# Patient Record
Sex: FEMALE | Race: BLACK OR AFRICAN AMERICAN | NOT HISPANIC OR LATINO | Employment: FULL TIME | URBAN - METROPOLITAN AREA
[De-identification: names, ages, dates, MRNs, and addresses within clinical notes are randomized per-mention and may not be internally consistent; named-entity substitution may affect disease eponyms.]

---

## 2017-09-18 ENCOUNTER — APPOINTMENT (OUTPATIENT)
Dept: OTHER | Facility: IMAGING CENTER | Age: 32
End: 2017-09-18

## 2017-10-05 ENCOUNTER — EH NON-PROVIDER (OUTPATIENT)
Dept: OCCUPATIONAL MEDICINE | Facility: CLINIC | Age: 32
End: 2017-10-05

## 2017-10-05 ENCOUNTER — HOSPITAL ENCOUNTER (OUTPATIENT)
Facility: MEDICAL CENTER | Age: 32
End: 2017-10-05
Attending: PREVENTIVE MEDICINE
Payer: COMMERCIAL

## 2017-10-05 ENCOUNTER — EMPLOYEE HEALTH (OUTPATIENT)
Dept: OCCUPATIONAL MEDICINE | Facility: CLINIC | Age: 32
End: 2017-10-05

## 2017-10-05 VITALS
DIASTOLIC BLOOD PRESSURE: 72 MMHG | SYSTOLIC BLOOD PRESSURE: 110 MMHG | BODY MASS INDEX: 33.75 KG/M2 | TEMPERATURE: 96.6 F | HEART RATE: 120 BPM | HEIGHT: 66 IN | WEIGHT: 210 LBS | OXYGEN SATURATION: 96 %

## 2017-10-05 DIAGNOSIS — Z02.1 PRE-EMPLOYMENT HEALTH SCREENING EXAMINATION: ICD-10-CM

## 2017-10-05 DIAGNOSIS — Z02.1 PRE-EMPLOYMENT DRUG SCREENING: ICD-10-CM

## 2017-10-05 LAB
AMP AMPHETAMINE: NORMAL
BAR BARBITURATES: NORMAL
BZO BENZODIAZEPINES: NORMAL
COC COCAINE: NORMAL
HBV CORE AB SERPL QL IA: NEGATIVE
INT CON NEG: NORMAL
INT CON POS: NORMAL
MDMA ECSTASY: NORMAL
MET METHAMPHETAMINES: NORMAL
MTD METHADONE: NORMAL
OPI OPIATES: NORMAL
OXY OXYCODONE: NORMAL
PCP PHENCYCLIDINE: NORMAL
POC URINE DRUG SCREEN OCDRS: NORMAL
THC: NORMAL

## 2017-10-05 PROCEDURE — 86787 VARICELLA-ZOSTER ANTIBODY: CPT | Performed by: PREVENTIVE MEDICINE

## 2017-10-05 PROCEDURE — 86480 TB TEST CELL IMMUN MEASURE: CPT | Performed by: PREVENTIVE MEDICINE

## 2017-10-05 PROCEDURE — 86706 HEP B SURFACE ANTIBODY: CPT | Performed by: PREVENTIVE MEDICINE

## 2017-10-05 PROCEDURE — 80305 DRUG TEST PRSMV DIR OPT OBS: CPT | Performed by: PREVENTIVE MEDICINE

## 2017-10-05 PROCEDURE — 86765 RUBEOLA ANTIBODY: CPT | Performed by: PREVENTIVE MEDICINE

## 2017-10-05 PROCEDURE — 94375 RESPIRATORY FLOW VOLUME LOOP: CPT | Performed by: PREVENTIVE MEDICINE

## 2017-10-05 PROCEDURE — 8915 PR COMPREHENSIVE PHYSICAL: Performed by: PREVENTIVE MEDICINE

## 2017-10-05 PROCEDURE — 90715 TDAP VACCINE 7 YRS/> IM: CPT | Performed by: PREVENTIVE MEDICINE

## 2017-10-05 PROCEDURE — 86704 HEP B CORE ANTIBODY TOTAL: CPT | Performed by: PREVENTIVE MEDICINE

## 2017-10-05 PROCEDURE — 87340 HEPATITIS B SURFACE AG IA: CPT | Performed by: PREVENTIVE MEDICINE

## 2017-10-05 PROCEDURE — 86762 RUBELLA ANTIBODY: CPT | Performed by: PREVENTIVE MEDICINE

## 2017-10-05 PROCEDURE — 86735 MUMPS ANTIBODY: CPT | Performed by: PREVENTIVE MEDICINE

## 2017-10-05 NOTE — NON-PROVIDER
[employer] New [employer]  1. Drug Screen   2. Immunizations  - Quantiferon  labs  - Varicella  Labs  - MMR  labs  - Hep B  Labs   - Tdap  Given 10/05/17 pt is 31 weeks pregnant ok per Dr Meléndez to give vaccine.    - Flu   Declined   3. Mask Fit        N-95 Reg and CAPR   4. Physical Clearance

## 2017-10-06 LAB
HBV SURFACE AB SERPL IA-ACNC: <3.1 MIU/ML (ref 0–10)
HBV SURFACE AG SER QL: NEGATIVE
M TB TUBERC IFN-G BLD QL: NEGATIVE
M TB TUBERC IFN-G/MITOGEN IGNF BLD: -0
M TB TUBERC IGNF/MITOGEN IGNF CONTROL: 47.12 [IU]/ML
MEV IGG SER IA-ACNC: POSITIVE
MITOGEN IGNF BCKGRD COR BLD-ACNC: 0.02 [IU]/ML
MUV IGG SER IA-ACNC: POSITIVE
RUBV AB SER QL: 88.8 IU/ML
VZV IGG SER IA-ACNC: POSITIVE

## 2017-11-03 ENCOUNTER — HOSPITAL ENCOUNTER (OUTPATIENT)
Facility: MEDICAL CENTER | Age: 32
End: 2017-11-03
Attending: SPECIALIST | Admitting: SPECIALIST
Payer: MEDICAID

## 2017-11-03 VITALS — DIASTOLIC BLOOD PRESSURE: 74 MMHG | TEMPERATURE: 98.3 F | HEART RATE: 104 BPM | SYSTOLIC BLOOD PRESSURE: 127 MMHG

## 2017-11-03 LAB
APPEARANCE UR: ABNORMAL
COLOR UR AUTO: ABNORMAL
CRYSTALS AMN MICRO: NORMAL
GLUCOSE UR QL STRIP.AUTO: NEGATIVE MG/DL
KETONES UR QL STRIP.AUTO: NEGATIVE MG/DL
LEUKOCYTE ESTERASE UR QL STRIP.AUTO: NEGATIVE
NITRITE UR QL STRIP.AUTO: NEGATIVE
PH UR STRIP.AUTO: 6.5 [PH]
PROT UR QL STRIP: 30 MG/DL
RBC UR QL AUTO: ABNORMAL
SP GR UR: 1.02

## 2017-11-03 PROCEDURE — 89060 EXAM SYNOVIAL FLUID CRYSTALS: CPT

## 2017-11-03 PROCEDURE — 59025 FETAL NON-STRESS TEST: CPT | Performed by: SPECIALIST

## 2017-11-03 PROCEDURE — 81002 URINALYSIS NONAUTO W/O SCOPE: CPT

## 2017-11-04 NOTE — PROGRESS NOTES
2145- 33 y/o, , EDC 17, EGA 34.3. Here to room LDA05. C/o lof when she vomited in the shower x1. Patient denies any leaking since and states she doesn't know if she peed herself or her water broke. EFM/toco applied, patient denies bleeding, reports positive fm. VSS.     - Sterile speculum exam performed. No pooling or wetness noted. Fern sent to lab. Large blood in urine. Patient denies any pain on urination but states she has sharp lower back pain that comes and goes. Urine is tre color.     - Fern test negative. Updates to Dr. Pugh. MD to bedside for assessment. MD reviewed FHT. Discharge instructions received. Urine culture ordered.     - Discussed discharge instruction with patient including  labor precautions and reasons to return. Encouraged patient to return for increased back pain or pain on urination. Patient to follow up with Dr. Pugh in the office on Monday. Patient agrees and has no further questions. Patient ambulated off the unit.

## 2017-11-18 ENCOUNTER — TELEPHONE (OUTPATIENT)
Dept: OCCUPATIONAL MEDICINE | Facility: CLINIC | Age: 32
End: 2017-11-18

## 2017-11-18 NOTE — TELEPHONE ENCOUNTER
Phone Number Called: [phone] (home)       Message: spoke to pt and will come in for hep b decliantion    Left Message for patient to call back: no

## 2017-12-06 ENCOUNTER — HOSPITAL ENCOUNTER (INPATIENT)
Facility: MEDICAL CENTER | Age: 32
LOS: 2 days | End: 2017-12-08
Attending: SPECIALIST | Admitting: SPECIALIST
Payer: MEDICAID

## 2017-12-06 PROBLEM — Z34.90 PREGNANCY: Status: ACTIVE | Noted: 2017-12-06

## 2017-12-06 LAB
BASOPHILS # BLD AUTO: 0.3 % (ref 0–1.8)
BASOPHILS # BLD: 0.04 K/UL (ref 0–0.12)
EOSINOPHIL # BLD AUTO: 0.13 K/UL (ref 0–0.51)
EOSINOPHIL NFR BLD: 0.9 % (ref 0–6.9)
ERYTHROCYTE [DISTWIDTH] IN BLOOD BY AUTOMATED COUNT: 47.6 FL (ref 35.9–50)
ERYTHROCYTE [DISTWIDTH] IN BLOOD BY AUTOMATED COUNT: 48 FL (ref 35.9–50)
HCT VFR BLD AUTO: 31.9 % (ref 37–47)
HCT VFR BLD AUTO: 33.8 % (ref 37–47)
HGB BLD-MCNC: 10.6 G/DL (ref 12–16)
HGB BLD-MCNC: 11.4 G/DL (ref 12–16)
HOLDING TUBE BB 8507: NORMAL
IMM GRANULOCYTES # BLD AUTO: 0.08 K/UL (ref 0–0.11)
IMM GRANULOCYTES NFR BLD AUTO: 0.5 % (ref 0–0.9)
LYMPHOCYTES # BLD AUTO: 2.88 K/UL (ref 1–4.8)
LYMPHOCYTES NFR BLD: 19.4 % (ref 22–41)
MCH RBC QN AUTO: 32.9 PG (ref 27–33)
MCH RBC QN AUTO: 33 PG (ref 27–33)
MCHC RBC AUTO-ENTMCNC: 33.2 G/DL (ref 33.6–35)
MCHC RBC AUTO-ENTMCNC: 33.7 G/DL (ref 33.6–35)
MCV RBC AUTO: 98 FL (ref 81.4–97.8)
MCV RBC AUTO: 99.1 FL (ref 81.4–97.8)
MONOCYTES # BLD AUTO: 0.73 K/UL (ref 0–0.85)
MONOCYTES NFR BLD AUTO: 4.9 % (ref 0–13.4)
NEUTROPHILS # BLD AUTO: 10.95 K/UL (ref 2–7.15)
NEUTROPHILS NFR BLD: 74 % (ref 44–72)
NRBC # BLD AUTO: 0 K/UL
NRBC BLD AUTO-RTO: 0 /100 WBC
PLATELET # BLD AUTO: 276 K/UL (ref 164–446)
PLATELET # BLD AUTO: 305 K/UL (ref 164–446)
PMV BLD AUTO: 9.4 FL (ref 9–12.9)
PMV BLD AUTO: 9.6 FL (ref 9–12.9)
RBC # BLD AUTO: 3.22 M/UL (ref 4.2–5.4)
RBC # BLD AUTO: 3.45 M/UL (ref 4.2–5.4)
WBC # BLD AUTO: 14.8 K/UL (ref 4.8–10.8)
WBC # BLD AUTO: 19.4 K/UL (ref 4.8–10.8)

## 2017-12-06 PROCEDURE — 4A1HX4Z MONITORING OF PRODUCTS OF CONCEPTION, CARDIAC ELECTRICAL ACTIVITY, EXTERNAL APPROACH: ICD-10-PCS | Performed by: SPECIALIST

## 2017-12-06 PROCEDURE — 700102 HCHG RX REV CODE 250 W/ 637 OVERRIDE(OP): Performed by: SPECIALIST

## 2017-12-06 PROCEDURE — 770002 HCHG ROOM/CARE - OB PRIVATE (112)

## 2017-12-06 PROCEDURE — 36415 COLL VENOUS BLD VENIPUNCTURE: CPT

## 2017-12-06 PROCEDURE — 85027 COMPLETE CBC AUTOMATED: CPT

## 2017-12-06 PROCEDURE — 59409 OBSTETRICAL CARE: CPT

## 2017-12-06 PROCEDURE — 700105 HCHG RX REV CODE 258

## 2017-12-06 PROCEDURE — 85025 COMPLETE CBC W/AUTO DIFF WBC: CPT

## 2017-12-06 PROCEDURE — 700111 HCHG RX REV CODE 636 W/ 250 OVERRIDE (IP): Performed by: SPECIALIST

## 2017-12-06 PROCEDURE — 700111 HCHG RX REV CODE 636 W/ 250 OVERRIDE (IP)

## 2017-12-06 PROCEDURE — A9270 NON-COVERED ITEM OR SERVICE: HCPCS | Performed by: SPECIALIST

## 2017-12-06 PROCEDURE — 10907ZC DRAINAGE OF AMNIOTIC FLUID, THERAPEUTIC FROM PRODUCTS OF CONCEPTION, VIA NATURAL OR ARTIFICIAL OPENING: ICD-10-PCS | Performed by: SPECIALIST

## 2017-12-06 PROCEDURE — 304965 HCHG RECOVERY SERVICES

## 2017-12-06 PROCEDURE — 700112 HCHG RX REV CODE 229: Performed by: SPECIALIST

## 2017-12-06 PROCEDURE — 3E033VJ INTRODUCTION OF OTHER HORMONE INTO PERIPHERAL VEIN, PERCUTANEOUS APPROACH: ICD-10-PCS | Performed by: SPECIALIST

## 2017-12-06 RX ORDER — OXYCODONE AND ACETAMINOPHEN 10; 325 MG/1; MG/1
1 TABLET ORAL EVERY 4 HOURS PRN
Status: DISCONTINUED | OUTPATIENT
Start: 2017-12-06 | End: 2017-12-08 | Stop reason: HOSPADM

## 2017-12-06 RX ORDER — CALCIUM CARBONATE 500 MG/1
500 TABLET, CHEWABLE ORAL DAILY
COMMUNITY

## 2017-12-06 RX ORDER — PROMETHAZINE HYDROCHLORIDE 25 MG/1
25 TABLET ORAL PRN
COMMUNITY

## 2017-12-06 RX ORDER — SODIUM CHLORIDE, SODIUM LACTATE, POTASSIUM CHLORIDE, CALCIUM CHLORIDE 600; 310; 30; 20 MG/100ML; MG/100ML; MG/100ML; MG/100ML
INJECTION, SOLUTION INTRAVENOUS PRN
Status: DISCONTINUED | OUTPATIENT
Start: 2017-12-06 | End: 2017-12-08 | Stop reason: HOSPADM

## 2017-12-06 RX ORDER — LIDOCAINE HYDROCHLORIDE 10 MG/ML
INJECTION, SOLUTION INFILTRATION; PERINEURAL
Status: ACTIVE
Start: 2017-12-06 | End: 2017-12-07

## 2017-12-06 RX ORDER — AMPICILLIN 2 G/1
2 INJECTION, POWDER, FOR SOLUTION INTRAVENOUS ONCE
Status: DISCONTINUED | OUTPATIENT
Start: 2017-12-06 | End: 2017-12-06 | Stop reason: SDUPTHER

## 2017-12-06 RX ORDER — ONDANSETRON 2 MG/ML
4 INJECTION INTRAMUSCULAR; INTRAVENOUS EVERY 6 HOURS PRN
Status: DISCONTINUED | OUTPATIENT
Start: 2017-12-06 | End: 2017-12-08 | Stop reason: HOSPADM

## 2017-12-06 RX ORDER — AMPICILLIN 2 G/1
2 INJECTION, POWDER, FOR SOLUTION INTRAVENOUS ONCE
Status: COMPLETED | OUTPATIENT
Start: 2017-12-06 | End: 2017-12-06

## 2017-12-06 RX ORDER — IBUPROFEN 600 MG/1
600 TABLET ORAL EVERY 6 HOURS PRN
Status: DISCONTINUED | OUTPATIENT
Start: 2017-12-06 | End: 2017-12-08 | Stop reason: HOSPADM

## 2017-12-06 RX ORDER — OXYCODONE HYDROCHLORIDE AND ACETAMINOPHEN 5; 325 MG/1; MG/1
1 TABLET ORAL EVERY 4 HOURS PRN
Status: DISCONTINUED | OUTPATIENT
Start: 2017-12-06 | End: 2017-12-08 | Stop reason: HOSPADM

## 2017-12-06 RX ORDER — METHYLERGONOVINE MALEATE 0.2 MG/ML
0.2 INJECTION INTRAVENOUS
Status: DISCONTINUED | OUTPATIENT
Start: 2017-12-06 | End: 2017-12-06 | Stop reason: HOSPADM

## 2017-12-06 RX ORDER — SODIUM CHLORIDE, SODIUM LACTATE, POTASSIUM CHLORIDE, CALCIUM CHLORIDE 600; 310; 30; 20 MG/100ML; MG/100ML; MG/100ML; MG/100ML
INJECTION, SOLUTION INTRAVENOUS
Status: COMPLETED
Start: 2017-12-06 | End: 2017-12-06

## 2017-12-06 RX ORDER — SODIUM CHLORIDE, SODIUM LACTATE, POTASSIUM CHLORIDE, CALCIUM CHLORIDE 600; 310; 30; 20 MG/100ML; MG/100ML; MG/100ML; MG/100ML
INJECTION, SOLUTION INTRAVENOUS CONTINUOUS
Status: DISCONTINUED | OUTPATIENT
Start: 2017-12-06 | End: 2017-12-06 | Stop reason: HOSPADM

## 2017-12-06 RX ORDER — HYDROXYZINE 50 MG/1
50 TABLET, FILM COATED ORAL EVERY 6 HOURS PRN
Status: DISCONTINUED | OUTPATIENT
Start: 2017-12-06 | End: 2017-12-06 | Stop reason: HOSPADM

## 2017-12-06 RX ORDER — SODIUM CHLORIDE, SODIUM LACTATE, POTASSIUM CHLORIDE, CALCIUM CHLORIDE 600; 310; 30; 20 MG/100ML; MG/100ML; MG/100ML; MG/100ML
INJECTION, SOLUTION INTRAVENOUS CONTINUOUS
Status: DISCONTINUED | OUTPATIENT
Start: 2017-12-06 | End: 2017-12-06

## 2017-12-06 RX ORDER — AMPICILLIN 2 G/1
INJECTION, POWDER, FOR SOLUTION INTRAVENOUS
Status: COMPLETED
Start: 2017-12-06 | End: 2017-12-06

## 2017-12-06 RX ORDER — ALUMINA, MAGNESIA, AND SIMETHICONE 2400; 2400; 240 MG/30ML; MG/30ML; MG/30ML
30 SUSPENSION ORAL EVERY 6 HOURS PRN
Status: DISCONTINUED | OUTPATIENT
Start: 2017-12-06 | End: 2017-12-06 | Stop reason: HOSPADM

## 2017-12-06 RX ORDER — ACETAMINOPHEN 325 MG/1
325 TABLET ORAL EVERY 4 HOURS PRN
Status: DISCONTINUED | OUTPATIENT
Start: 2017-12-06 | End: 2017-12-08 | Stop reason: HOSPADM

## 2017-12-06 RX ORDER — METHYLERGONOVINE MALEATE 0.2 MG/ML
0.2 INJECTION INTRAVENOUS
Status: DISCONTINUED | OUTPATIENT
Start: 2017-12-06 | End: 2017-12-08 | Stop reason: HOSPADM

## 2017-12-06 RX ORDER — ONDANSETRON 4 MG/1
4 TABLET, ORALLY DISINTEGRATING ORAL EVERY 6 HOURS PRN
Status: DISCONTINUED | OUTPATIENT
Start: 2017-12-06 | End: 2017-12-08 | Stop reason: HOSPADM

## 2017-12-06 RX ORDER — ROPIVACAINE HYDROCHLORIDE 2 MG/ML
INJECTION, SOLUTION EPIDURAL; INFILTRATION; PERINEURAL
Status: ACTIVE
Start: 2017-12-06 | End: 2017-12-07

## 2017-12-06 RX ORDER — AMPICILLIN 1 G/1
1 INJECTION, POWDER, FOR SOLUTION INTRAMUSCULAR; INTRAVENOUS EVERY 4 HOURS
Status: DISCONTINUED | OUTPATIENT
Start: 2017-12-06 | End: 2017-12-06 | Stop reason: SDUPTHER

## 2017-12-06 RX ORDER — AMPICILLIN 1 G/1
1 INJECTION, POWDER, FOR SOLUTION INTRAMUSCULAR; INTRAVENOUS EVERY 4 HOURS
Status: DISCONTINUED | OUTPATIENT
Start: 2017-12-06 | End: 2017-12-06 | Stop reason: HOSPADM

## 2017-12-06 RX ORDER — DOCUSATE SODIUM 100 MG/1
100 CAPSULE, LIQUID FILLED ORAL 2 TIMES DAILY PRN
Status: DISCONTINUED | OUTPATIENT
Start: 2017-12-06 | End: 2017-12-08 | Stop reason: HOSPADM

## 2017-12-06 RX ORDER — VITAMIN A ACETATE, BETA CAROTENE, ASCORBIC ACID, CHOLECALCIFEROL, .ALPHA.-TOCOPHEROL ACETATE, DL-, THIAMINE MONONITRATE, RIBOFLAVIN, NIACINAMIDE, PYRIDOXINE HYDROCHLORIDE, FOLIC ACID, CYANOCOBALAMIN, CALCIUM CARBONATE, FERROUS FUMARATE, ZINC OXIDE, CUPRIC OXIDE 3080; 12; 120; 400; 1; 1.84; 3; 20; 22; 920; 25; 200; 27; 10; 2 [IU]/1; UG/1; MG/1; [IU]/1; MG/1; MG/1; MG/1; MG/1; MG/1; [IU]/1; MG/1; MG/1; MG/1; MG/1; MG/1
1 TABLET, FILM COATED ORAL EVERY MORNING
Status: DISCONTINUED | OUTPATIENT
Start: 2017-12-07 | End: 2017-12-08 | Stop reason: HOSPADM

## 2017-12-06 RX ADMIN — AMPICILLIN SODIUM 1 G: 1 INJECTION, POWDER, FOR SOLUTION INTRAMUSCULAR; INTRAVENOUS at 10:22

## 2017-12-06 RX ADMIN — SODIUM CHLORIDE, POTASSIUM CHLORIDE, SODIUM LACTATE AND CALCIUM CHLORIDE 1000 ML: 600; 310; 30; 20 INJECTION, SOLUTION INTRAVENOUS at 06:24

## 2017-12-06 RX ADMIN — SODIUM CHLORIDE, SODIUM LACTATE, POTASSIUM CHLORIDE, CALCIUM CHLORIDE 1000 ML: 600; 310; 30; 20 INJECTION, SOLUTION INTRAVENOUS at 06:24

## 2017-12-06 RX ADMIN — AMPICILLIN SODIUM 2 G: 2 INJECTION, POWDER, FOR SOLUTION INTRAMUSCULAR; INTRAVENOUS at 06:22

## 2017-12-06 RX ADMIN — ALUMINUM HYDROXIDE, MAGNESIUM HYDROXIDE,SIMETHICONE 30 ML: 400; 400; 40 LIQUID ORAL at 08:40

## 2017-12-06 RX ADMIN — Medication 125 ML/HR: at 14:12

## 2017-12-06 RX ADMIN — DOCUSATE SODIUM 100 MG: 100 CAPSULE ORAL at 20:30

## 2017-12-06 RX ADMIN — IBUPROFEN 600 MG: 600 TABLET, FILM COATED ORAL at 15:58

## 2017-12-06 RX ADMIN — AMPICILLIN 2 G: 2 INJECTION, POWDER, FOR SOLUTION INTRAVENOUS at 06:22

## 2017-12-06 RX ADMIN — OXYCODONE HYDROCHLORIDE AND ACETAMINOPHEN 1 TABLET: 10; 325 TABLET ORAL at 15:58

## 2017-12-06 RX ADMIN — Medication 1 MILLI-UNITS/MIN: at 07:53

## 2017-12-06 RX ADMIN — OXYCODONE HYDROCHLORIDE AND ACETAMINOPHEN 1 TABLET: 10; 325 TABLET ORAL at 20:30

## 2017-12-06 ASSESSMENT — PAIN SCALES - GENERAL
PAINLEVEL_OUTOF10: 7
PAINLEVEL_OUTOF10: 2
PAINLEVEL_OUTOF10: 7
PAINLEVEL_OUTOF10: 6
PAINLEVEL_OUTOF10: 2

## 2017-12-06 ASSESSMENT — PATIENT HEALTH QUESTIONNAIRE - PHQ9
2. FEELING DOWN, DEPRESSED, IRRITABLE, OR HOPELESS: NOT AT ALL
SUM OF ALL RESPONSES TO PHQ QUESTIONS 1-9: 0
1. LITTLE INTEREST OR PLEASURE IN DOING THINGS: NOT AT ALL
SUM OF ALL RESPONSES TO PHQ9 QUESTIONS 1 AND 2: 0

## 2017-12-06 ASSESSMENT — LIFESTYLE VARIABLES
EVER_SMOKED: YES
DO YOU DRINK ALCOHOL: NO
DO YOU DRINK ALCOHOL: NO

## 2017-12-06 NOTE — DELIVERY NOTE
Normal spontaneous vaginal delivery.   Live term female , Apgar scores of about 8 and 9 at one and five minutes respectively.   Weight not yet recorded.   Baby delivered over intact perineum under no anesthesia.   Placenta simply delivered and examined and found to be complete.   No vulvar or vaginal lacerations.   Uterus firm.   EBL about 100 cc's.   Evelio Pugh M.D.

## 2017-12-06 NOTE — PROGRESS NOTES
DATE OF SERVICE:  2017    The patient was seen on 2017.    The patient is a very pleasant 32-year-old multipara ( 7, para 3), at   34 weeks and 3 days gestation.  She presented to labor and delivery today with   complaints of leakage of fluid per vagina and says she has had no further   leakage of fluid per vagina since the initial episode when she noticed leakage   of fluid per vagina.  She was concerned that membranes might have ruptured   and so she presented to labor and delivery.  In labor and delivery, her vital   signs were stable and she was afebrile, and sterile speculum exam performed by   the nurse revealed no pooling and a fern test was sent and came back   negative.  I spoke with the patient and explained to her that it appears that   she has not had rupture of membranes.  I asked her to follow up with me in the   office for her previously scheduled appointment and to call or contact me at   any time should she have any problems or questions or complaints and she said   that she would do so.       ____________________________________     MD KAYODE CRUZ / NTS    DD:  2017 05:31:00  DT:  2017 10:23:27    D#:  [id number]  Job#:  [id number]

## 2017-12-06 NOTE — PROGRESS NOTES
The patient is a very pleasant 32 year old multipara (para 3, with 3 previous vaginal deliveries) at 39 weeks and 1 day gestation with prenatal care with myself and she is admitted this morning for elective induction of labor. By the nurse's exam the cervix is 4 cm dilated and 80 percent effaced and ballotable. The fetal heart tracing is category one. Her recent vaginal culture for GBS was positive and so she was started on ampicillin two grams IV. We will start pitocin and gradually increase pitocin. Will give analgesia prn and anticipate an obstetrical delivery.   Evelio Pugh M.D.

## 2017-12-06 NOTE — PROGRESS NOTES
I just checked the cervix and found the cervix to be about 8 cm dilated and 80 to 90 percent effaced and about 0 station. She already received her second dose of ampicillin. I then performed artificial rupture of membranes and clear amniotic fluid was observed. The fetal heart tracing is category one. Will anticipate an obstetrical delivery.   Evelio Pugh M.D.

## 2017-12-06 NOTE — PROGRESS NOTES
DATE OF SERVICE:  11/03/2017    The patient is a very pleasant 32-year-old multipara (para 3 with 3 previous   vaginal deliveries), who was 34 weeks and 3 days gestation.  She presented   with complaints of leakage of fluid shortly after she had experienced   vomiting.  She had not had any further leakage of fluid since this episode of   leakage of fluid per vagina.  The nurse performed a sterile speculum and no   pooling was seen.  The fern test came back negative.  I spoke with the patient   and gave her reassurance and she was discharged home.       ____________________________________     DONALDO NAVARRETE MD    MED / NTS    DD:  12/06/2017 07:14:21  DT:  12/06/2017 07:31:24    D#:  [id number]  Job#:  [id number]

## 2017-12-06 NOTE — CARE PLAN
Problem: Pain  Goal: Alleviation of Pain or a reduction in pain to the patient's comfort goal    Intervention: Pain Management - Epidural/Spinal  Pain management discussed and pt would like to go natural. Pt is aware of epidural availability.       Problem: Risk for Infection, Impaired Wound Healing  Goal: Remain free from signs and symptoms of infection    Intervention: Infection prevention measures  Practice proper hand hygiene before and after pt contact.

## 2017-12-06 NOTE — PROGRESS NOTES
0700-bedside report received from MARIANGEL Canales.  Pt VS stable, reports +FM, denies LOF or Vb. No reports pain at this time.   0723-Talked with Dr Pugh on the phone. FHT baseline 160's, pt temp 97.2F, Dr Pugh aware and ok to start pitocin. Plans to re-check pt around lunch and possibly AROM. Labor plan discussed with pt and plan for no epidural.   1130- Pt reports SROM, puddle noted on pad, clear. UC pain increasing. Discussed pain management  1135- Dr Pugh notified and aware. Will check pt when he arrives.   1235-Dr Pugh at bedside. AROM clear fluid SVE 8/80/-1. Pt wants epidural. Understands time constraints.   1300- SVE 9/90/+1. Dr Pugh made aware and on his way.  1318- Delivery of viable baby girl, apgars 8/9. True knot noted.   1320-Delivery of intact placenta. No Lacerations. Pitocin opened.   1332- Fundus firm at U with one large clot expressed and moderate bleeding.  pad changed and ice packs applied.   1500-Pt fundus firm at U with light bleeding. Pt up to Br and able to void. Gown changed and underwear with pad applied. Transferred to PPU.  1510- Bedside report given to MARIANGEL Heck.

## 2017-12-07 PROCEDURE — 770002 HCHG ROOM/CARE - OB PRIVATE (112)

## 2017-12-07 PROCEDURE — A9270 NON-COVERED ITEM OR SERVICE: HCPCS | Performed by: SPECIALIST

## 2017-12-07 PROCEDURE — 90471 IMMUNIZATION ADMIN: CPT

## 2017-12-07 PROCEDURE — 700111 HCHG RX REV CODE 636 W/ 250 OVERRIDE (IP): Performed by: SPECIALIST

## 2017-12-07 PROCEDURE — 700112 HCHG RX REV CODE 229: Performed by: SPECIALIST

## 2017-12-07 PROCEDURE — 3E0234Z INTRODUCTION OF SERUM, TOXOID AND VACCINE INTO MUSCLE, PERCUTANEOUS APPROACH: ICD-10-PCS | Performed by: SPECIALIST

## 2017-12-07 PROCEDURE — 90732 PPSV23 VACC 2 YRS+ SUBQ/IM: CPT | Performed by: SPECIALIST

## 2017-12-07 PROCEDURE — 700102 HCHG RX REV CODE 250 W/ 637 OVERRIDE(OP): Performed by: SPECIALIST

## 2017-12-07 RX ORDER — OXYCODONE HYDROCHLORIDE AND ACETAMINOPHEN 5; 325 MG/1; MG/1
1 TABLET ORAL EVERY 6 HOURS PRN
Qty: 28 TAB | Refills: 0 | Status: SHIPPED | OUTPATIENT
Start: 2017-12-07

## 2017-12-07 RX ORDER — IBUPROFEN 600 MG/1
800 TABLET ORAL EVERY 8 HOURS PRN
Qty: 30 TAB | Refills: 0 | Status: SHIPPED | OUTPATIENT
Start: 2017-12-07

## 2017-12-07 RX ADMIN — IBUPROFEN 600 MG: 600 TABLET, FILM COATED ORAL at 09:50

## 2017-12-07 RX ADMIN — IBUPROFEN 600 MG: 600 TABLET, FILM COATED ORAL at 19:16

## 2017-12-07 RX ADMIN — Medication 1 TABLET: at 09:50

## 2017-12-07 RX ADMIN — PNEUMOCOCCAL VACCINE POLYVALENT 25 MCG
25; 25; 25; 25; 25; 25; 25; 25; 25; 25; 25; 25; 25; 25; 25; 25; 25; 25; 25; 25; 25; 25; 25 INJECTION, SOLUTION INTRAMUSCULAR; SUBCUTANEOUS at 00:12

## 2017-12-07 RX ADMIN — DOCUSATE SODIUM 100 MG: 100 CAPSULE ORAL at 19:16

## 2017-12-07 RX ADMIN — OXYCODONE HYDROCHLORIDE AND ACETAMINOPHEN 1 TABLET: 10; 325 TABLET ORAL at 09:50

## 2017-12-07 RX ADMIN — IBUPROFEN 600 MG: 600 TABLET, FILM COATED ORAL at 00:30

## 2017-12-07 RX ADMIN — OXYCODONE AND ACETAMINOPHEN 1 TABLET: 5; 325 TABLET ORAL at 19:15

## 2017-12-07 RX ADMIN — OXYCODONE HYDROCHLORIDE AND ACETAMINOPHEN 1 TABLET: 10; 325 TABLET ORAL at 00:30

## 2017-12-07 ASSESSMENT — PAIN SCALES - GENERAL
PAINLEVEL_OUTOF10: 6
PAINLEVEL_OUTOF10: 5
PAINLEVEL_OUTOF10: 2
PAINLEVEL_OUTOF10: 4
PAINLEVEL_OUTOF10: 8
PAINLEVEL_OUTOF10: 3
PAINLEVEL_OUTOF10: 4
PAINLEVEL_OUTOF10: 7
PAINLEVEL_OUTOF10: 5

## 2017-12-07 ASSESSMENT — PATIENT HEALTH QUESTIONNAIRE - PHQ9
1. LITTLE INTEREST OR PLEASURE IN DOING THINGS: NOT AT ALL
SUM OF ALL RESPONSES TO PHQ QUESTIONS 1-9: 0
SUM OF ALL RESPONSES TO PHQ9 QUESTIONS 1 AND 2: 0
2. FEELING DOWN, DEPRESSED, IRRITABLE, OR HOPELESS: NOT AT ALL

## 2017-12-07 NOTE — PROGRESS NOTES
POST PARTUM DAY # 1  The patient complains of cramping pain.   She says that she feels fine other wise and she says that she has no other problems or complaints.   The patient's vital signs are stable and she is afebrile.   She appears well developed and well nourished and relaxed and alert and comfortable and in no apparent distress.   Labs: the patient's hemoglobin went from 11.4 grams per deciliter antepartum to 10.6 grams per deciliter post partum.   Will discharge home today.   Rx's for percocet and ibuprofen.   Follow up in office in 6 days for post partum visit.   Evelio Pugh M.D.

## 2017-12-07 NOTE — PROGRESS NOTES
1530 Receive patient from labor and delivery. Oriented to room and emergency call light and the oralia light education for mother and baby. Instructed to call first time to void and to watch increase bleeding and start increase fluid intake.

## 2017-12-07 NOTE — PROGRESS NOTES
Report received from night RN. assumed patient care at 0715. Pt A&OX4. Not in any form of distress at this time.  VSS, Assessment completed. Fundus at umbilical. No heavy bleeding noted. Pt breast feeding. attmepted feeding but baby too sleepy at this time. Call light within reach, personal belongings available, bed in lowest position, hourly rounding in place. Pt educated in importance of calling for assistance. Medications given as ordered (see MAR), reviewed POC with pt. No additional needs at this time.

## 2017-12-07 NOTE — PROGRESS NOTES
This patient has discharge order. Baby is not discharging today. Pt  doesn't want to go home w/o baby.  notified. Discharge order cancelled for today. Pt updated. Ok with POC. Charge nurse notified.

## 2017-12-07 NOTE — PROGRESS NOTES
Pt received on her room awake with PIV on her left FA patent with Pitocin running. Denies any pain @ this time. Voiding without difficulty. Needs attended. Call light within reach. Hourly rounding practiced. Mom wants Pneumonia vaccine.

## 2017-12-07 NOTE — CARE PLAN
Problem: Infection  Goal: Will remain free from infection  Outcome: PROGRESSING AS EXPECTED  No s/s of infection noted. Pt Afebrile. PO intake well.    Problem: Pain Management  Goal: Pain level will decrease to patient's comfort goal  Outcome: PROGRESSING AS EXPECTED  PRN pain meds given with pain at tolerated rate.

## 2017-12-07 NOTE — CARE PLAN
Problem: Potential for postpartum infection related to presence of episiotomy/vaginal tear and/or uterine contamination  Goal: Patient will be absent from signs and symptoms of infection  Perineum slightly swollen but intact , instructed on pericare.     Problem: Alteration in comfort related to episiotomy, vaginal repair and/or after birth pains  Goal: Patient is able to ambulate, care for self and infant  Uses tucks, spray and ice. Pain well controlled with Motrin and Percocet given.

## 2017-12-07 NOTE — CONSULTS
Breastfeeding plan, breastfeed every 2-3 hours on demand when baby shows hunger cues. Mother plans to work from home and wants baby to be familiar with bottle. Encouraged mother to introduce bottle after breastfeeding using pumped breast milk. Teaching on feeding on demand every 2-3 hours, importance of skin to skin, getting baby to open mouth wide for deep latch. Mother has HPP and plans to follow up with [facility] for consults.

## 2017-12-07 NOTE — DELIVERY NOTE
DATE OF SERVICE:  2017    The patient is a very pleasant 32-year-old multipara (on admission  7,   para 3) who was admitted to [facility] labor and delivery   this morning, Wednesday common 2017, and she was admitted at this   morning at 39 weeks and 1 day gestation for induction of labor.  She had her   prenatal care with myself during the course of this pregnancy.  She was   admitted this morning for elective induction of labor.  By the nurse's exam,   the patient's cervix was found to be about 4 cm dilated and 80% effaced and   ballottable.  The fetal heart tracing was found to be category 1.  Recent   vaginal culture for group B strep was positive for group B strep and so on   admission, the patient was started on intravenous ampicillin 2 g intravenously   and then 1 g intravenously every 4 hours thereafter.  Pitocin was started and   gradually increased.  At about 12:30 to 12:45, I checked her cervix and found   her cervix to about 8 cm dilated and 80-90% effaced and 0 station.  At that   time, she had already received her second dose of ampicillin.  I then   performed artificial rupture of membranes and clear amniotic fluid was   observed.  The fetal heart tracing was found to be category 1.  Then, not long   thereafter, at sometime between 1 o'clock in the afternoon and 1:30 in the   afternoon, Wednesday, 2017, she went on to have a normal spontaneous   vaginal delivery of a live term female  with Apgar scores of   approximately 8 and 9 at 1 and 5 minutes respectively and a  weight of   3,290 grams.  Baby was delivered over an intact perineum under no anesthesia.    Placenta was simply delivered and examined and found to be complete.    Examination of the vulva and vagina revealed that there were no vulvar or   vaginal lacerations.  The uterus was firm.  Estimated blood loss was   approximately 100 mL.       ____________________________________      DONALDO NAVARRETE MD    MED / NTS    DD:  12/06/2017 19:51:25  DT:  12/07/2017 03:30:36    D#:  [id number]  Job#:  [id number]

## 2017-12-08 VITALS
SYSTOLIC BLOOD PRESSURE: 99 MMHG | RESPIRATION RATE: 18 BRPM | DIASTOLIC BLOOD PRESSURE: 62 MMHG | HEART RATE: 66 BPM | WEIGHT: 208 LBS | HEIGHT: 66 IN | TEMPERATURE: 98.2 F | OXYGEN SATURATION: 95 % | BODY MASS INDEX: 33.43 KG/M2

## 2017-12-08 PROCEDURE — A9270 NON-COVERED ITEM OR SERVICE: HCPCS | Performed by: SPECIALIST

## 2017-12-08 PROCEDURE — 700112 HCHG RX REV CODE 229: Performed by: SPECIALIST

## 2017-12-08 PROCEDURE — 700102 HCHG RX REV CODE 250 W/ 637 OVERRIDE(OP): Performed by: SPECIALIST

## 2017-12-08 RX ADMIN — IBUPROFEN 600 MG: 600 TABLET, FILM COATED ORAL at 04:11

## 2017-12-08 RX ADMIN — OXYCODONE HYDROCHLORIDE AND ACETAMINOPHEN 1 TABLET: 10; 325 TABLET ORAL at 04:11

## 2017-12-08 RX ADMIN — Medication 1 TABLET: at 08:21

## 2017-12-08 RX ADMIN — OXYCODONE HYDROCHLORIDE AND ACETAMINOPHEN 1 TABLET: 10; 325 TABLET ORAL at 13:41

## 2017-12-08 RX ADMIN — IBUPROFEN 600 MG: 600 TABLET, FILM COATED ORAL at 13:41

## 2017-12-08 RX ADMIN — OXYCODONE HYDROCHLORIDE AND ACETAMINOPHEN 1 TABLET: 10; 325 TABLET ORAL at 08:22

## 2017-12-08 RX ADMIN — DOCUSATE SODIUM 100 MG: 100 CAPSULE ORAL at 08:22

## 2017-12-08 ASSESSMENT — LIFESTYLE VARIABLES: EVER_SMOKED: YES

## 2017-12-08 ASSESSMENT — COPD QUESTIONNAIRES
HAVE YOU SMOKED AT LEAST 100 CIGARETTES IN YOUR ENTIRE LIFE: NO/DON'T KNOW
DO YOU EVER COUGH UP ANY MUCUS OR PHLEGM?: NO/ONLY WITH OCCASIONAL COLDS OR INFECTIONS
COPD SCREENING SCORE: 0
DURING THE PAST 4 WEEKS HOW MUCH DID YOU FEEL SHORT OF BREATH: NONE/LITTLE OF THE TIME

## 2017-12-08 ASSESSMENT — PAIN SCALES - GENERAL
PAINLEVEL_OUTOF10: 2
PAINLEVEL_OUTOF10: 7
PAINLEVEL_OUTOF10: 0

## 2017-12-08 NOTE — PROGRESS NOTES
POST PARTUM DAY # 2  The patient says that she feels fine and that she has no problems or complaints.   Her discharge orders yesterday were cancelled because baby was not discharged home yesterday.   The patient's vital signs are stable and she is afebrile.   She appears well developed and well nourished and relaxed and alert and comfortable and in no apparent distress.   Will discharge home today.   Evelio Pugh M.D.

## 2017-12-08 NOTE — PROGRESS NOTES
Patient assessment complete. Fundus is firm with minimal discharge. Pt ambulates and voids. Plan of care discussed with patient and all questions answered. Whiteboard updated. Encouraged to call with call light.

## 2017-12-08 NOTE — CARE PLAN
Problem: Altered physiologic condition related to immediate post-delivery state and potential for bleeding/hemorrhage  Goal: Patient physiologically stable as evidenced by normal lochia, palpable uterine involution and vital signs within normal limits  Outcome: PROGRESSING AS EXPECTED  Pt's fundus remains firm with scant rubra lochia observed.  No signs of hemorrhage are present at this time.  Will continue to monitor per hospital policy.    Problem: Potential for postpartum infection related to presence of episiotomy/vaginal tear and/or uterine contamination  Goal: Patient will be absent from signs and symptoms of infection  Outcome: PROGRESSING AS EXPECTED  Pt's vital signs remain within defined limits.  No signs of infection are present.  Will continue to monitor per hospital policy.

## 2017-12-08 NOTE — PROGRESS NOTES
Verified and checked bands on parents and infant. Removed cord clamp and cuddles tag. Checked car seat and verified infant is strapped into car seat properly.

## 2017-12-08 NOTE — PROGRESS NOTES
"Received report from Jm Garcia RN; Assumed care of pt.    2000- POC discussed with pt and opportunity provided for questions.  Answers given to questions and pt verbalized understanding of information provided to her.  Denies having any further questions at this time.  No signs of distress observed in pt.  Will continue to monitor per hospital policy.  Pt requests to receive pain medication on a prn basis and will call RN if pain medication is needed.    2150- Pt states she feels as though her \"insides are moving\" when she is changes position.  States she feels a \"contraction\" with the moving of her \"insides\", but that pain goes away once she is done turning onto her side.  Fundus remains firm at 1 fingerbreadth below the umbilicus with scant bleeding observed.  Abdomen is soft and non-tender.  No signs of distress observed in pt.  Will continue to monitor.    0400- Infant had weight loss of 6% over the last 24 hours.  Supplementation with Enfamil formula began.  MOB to feed infant based on supplementation guidelines using a slow flow nipple.  Hand-out of guidelines provided to pt.  Plan is for pt to offer both breasts to infant first.  She will follow with supplementation of Enfamil and then pump using her personal pump at the bedside.  Pt verbalized understanding of everything told to her and denied having any questions.  "

## 2017-12-08 NOTE — PROGRESS NOTES
At 1030 met with mother who states she BF earlier and is planning on feeding formula now, mother states baby BF well, denies pain and/or need for assistance with BF, states she will return to work in 5-6 weeks so is concerned about making sure baby can take milk via bottle,  this LC stated understanding and agreement, mother bottle fed baby independently, infant tolerated feeding without any distress noted, educated on paced-bottle feeding as well as providing chin and cheek support if needed for feeding difficulties, supplementation guidelines provided and mother states understanding of proper supplement volumes.    Plan is to BF Q 2-3 hours, may pump and/or supplement with pumped milk/formula if needed, encouraged to wait to begin pumping for 3 weeks if baby BF well in order to allow for adequate breast stimulation in order to maximize milk production.    Mother states she has Medicaid, encouraged to attempt to establish care with WIC, contact information for WIC provided.    Encouraged too call for assistance as needed.

## 2017-12-08 NOTE — PROGRESS NOTES
Verified and checked bands on parents and infant. . Discharge instructions and paperwork given to patient.

## 2017-12-08 NOTE — CARE PLAN
Problem: Altered physiologic condition related to immediate post-delivery state and potential for bleeding/hemorrhage  Goal: Patient physiologically stable as evidenced by normal lochia, palpable uterine involution and vital signs within normal limits  Outcome: PROGRESSING AS EXPECTED  Fundus is firm and one finger breadth below the umbilicus. Lochia is light. Vital signs are within normal limits.     Problem: Potential for postpartum infection related to presence of episiotomy/vaginal tear and/or uterine contamination  Goal: Patient will be absent from signs and symptoms of infection  Outcome: PROGRESSING AS EXPECTED  Patient remains afebrile. Educated on importance of hand washing

## 2017-12-08 NOTE — DISCHARGE INSTRUCTIONS
POSTPARTUM DISCHARGE INSTRUCTIONS FOR MOM    YOB: 1985   Age: 32 y.o.               Admit Date: 12/6/2017     Discharge Date: 12/8/2017  Attending Doctor:  Evelio Pugh M.D.                  Allergies:  Patient has no known allergies.    Discharged to home by car. Discharged via wheelchair, hospital escort: Yes.  Special equipment needed: Not Applicable  Belongings with: Personal  Be sure to schedule a follow-up appointment with your primary care doctor or any specialists as instructed.     Discharge Plan:   Diet Plan: Discussed  Activity Level: Discussed  Smoking Cessation Offered: Patient Counseled  Confirmed Follow up Appointment: Patient to Call and Schedule Appointment  Confirmed Symptoms Management: Discussed  Medication Reconciliation Updated: Yes  Pneumococcal Vaccine Given - only chart on this line when given: Given (See MAR)  Influenza Vaccine Indication: Patient Refuses    REASONS TO CALL YOUR OBSTETRICIAN:  1.   Persistent fever or shaking chills (Temperature higher than 100.4)  2.   Heavy bleeding (soaking more than 1 pad per hour); Passing clots  3.   Foul odor from vagina  4.   Mastitis (Breast infection; breast pain, chills, fever, redness)  5.   Urinary pain, burning or frequency  6.   Episiotomy infection  7.   Severe depression longer than 24 hours    HAND WASHING  · Prior to handling the baby.  · Before breastfeeding or bottle feeding baby.  · After using the bathroom or changing the baby's diaper.    WOUND CARE  Ask your physician for additional care instructions.  In general:    VAGINAL CARE  · Nothing inside vagina for 6 weeks: no sexual intercourse, tampons or douching.  · Bleeding may continue for 2-4 weeks.  Amount may vary.    · Call your physician for heavy bleeding which means soaking more than 1 pad per hour    BIRTH CONTROL  · It is possible to become pregnant at any time after delivery and while breastfeeding.  · Plan to discuss a method of birth control with your  "physician at your follow up visit. visit.    DIET AND ELIMINATION  · Eating more fiber (bran cereal, fruits, and vegetables) and drinking plenty of fluids will help to avoid constipation.  · Urinary frequency after childbirth is normal.    POSTPARTUM BLUES  During the first few days after birth, you may experience a sense of the \"blues\" which may include impatience, irritability or even crying.  These feeling come and go quickly.  However, as many as 1 in 10 women experience emotional symptoms known as postpartum depression.    Postpartum depression:  May start as early as the second or third day after delivery or take several weeks or months to develop.  Symptoms of \"blues\" are present, but are more intense:  Crying spells; loss of appetite; feelings of hopelessness or loss of control; fear of touching the baby; over concern or no concern at all about the baby; little or no concern about your own appearance/caring for yourself; and/or inability to sleep or excessive sleeping.  Contact your physician if you are experiencing any of these symptoms.    Crisis Hotline:  · [place] Crisis Hotline:  4-713-OZHTSBQ  Or 1-933.129.8281  · [place] Crisis Hotline:  1-779.420.5150  Or 428-591-4587    PREVENTING SHAKEN BABY:  If you are angry or stressed, PUT THE BABY IN THE CRIB, step away, take some deep breaths, and wait until you are calm to care for the baby.  DO NOT SHAKE THE BABY.  You are not alone, call a supporter for help.    · Crisis Call Center 24/7 crisis line 941-234-5615 or 1-597.476.3157  · You can also text them, text \"ANSWER\" to 609805    QUIT SMOKING/TOBACCO USE:  I understand the use of any tobacco products increases my chance of suffering from future heart disease and could cause other illnesses which may shorten my life. Quitting the use of tobacco products is the single most important thing I can do to improve my health. For further information on smoking / tobacco cessation call a Toll Free Quit Line at " 1-390.921.6985 (*National Cancer [place]) or 1-961.531.2088 (American Lung Association) or you can access the web based program at www.lungusa.org.    · [place] Tobacco Users Help Line:  (933) 733-8136       Toll Free: 1-666.266.4474  · Quit Tobacco Program [facility] (230)246-1340    DEPRESSION / SUICIDE RISK:  As you are discharged from this [facility], it is important to learn how to keep safe from harming yourself.    Recognize the warning signs:  · Abrupt changes in personality, positive or negative- including increase in energy   · Giving away possessions  · Change in eating patterns- significant weight changes-  positive or negative  · Change in sleeping patterns- unable to sleep or sleeping all the time   · Unwillingness or inability to communicate  · Depression  · Unusual sadness, discouragement and loneliness  · Talk of wanting to die  · Neglect of personal appearance   · Rebelliousness- reckless behavior  · Withdrawal from people/activities they love  · Confusion- inability to concentrate     If you or a loved one observes any of these behaviors or has concerns about self-harm, here's what you can do:  · Talk about it- your feelings and reasons for harming yourself  · Remove any means that you might use to hurt yourself (examples: pills, rope, extension cords, firearm)  · Get professional help from the community (Mental Health, Substance Abuse, psychological counseling)  · Do not be alone:Call your Safe Contact- someone whom you trust who will be there for you.  · Call your local CRISIS HOTLINE 013-4287 or 375-248-9893  · Call your local Children's Mobile Crisis Response Team [place] (152) 962-9923 or www.[place]  · Call the toll free National Suicide Prevention Hotlines   · National Suicide Prevention Lifeline 107-148-CKNS (9317)  · National Hope Line Network 800-SUICIDE (167-4905)    DISCHARGE SURVEY:  Thank you for choosing [facility].  We hope we  provided you with very good care.  You may be receiving a survey in the mail.  Please fill it out.  Your opinion is valuable to us.    ADDITIONAL EDUCATIONAL MATERIALS GIVEN TO PATIENT:        My signature on this form indicates that:  1.  I have reviewed and understand the above information  2.  My questions regarding this information have been answered to my satisfaction.  3.  I have formulated a plan with my discharge nurse to obtain my prescribed medication for home.